# Patient Record
Sex: MALE | Race: WHITE
[De-identification: names, ages, dates, MRNs, and addresses within clinical notes are randomized per-mention and may not be internally consistent; named-entity substitution may affect disease eponyms.]

---

## 2021-04-20 ENCOUNTER — HOSPITAL ENCOUNTER (OUTPATIENT)
Dept: HOSPITAL 35 - PAIN | Age: 61
End: 2021-04-20
Attending: ANESTHESIOLOGY
Payer: COMMERCIAL

## 2021-04-20 VITALS — SYSTOLIC BLOOD PRESSURE: 141 MMHG | DIASTOLIC BLOOD PRESSURE: 102 MMHG

## 2021-04-20 VITALS — HEIGHT: 70.98 IN | WEIGHT: 200 LBS | BODY MASS INDEX: 28 KG/M2

## 2021-04-20 DIAGNOSIS — Z79.891: ICD-10-CM

## 2021-04-20 DIAGNOSIS — Z79.899: ICD-10-CM

## 2021-04-20 DIAGNOSIS — M50.10: Primary | ICD-10-CM

## 2021-04-20 DIAGNOSIS — M48.02: ICD-10-CM

## 2021-04-20 DIAGNOSIS — G89.4: ICD-10-CM

## 2021-04-20 DIAGNOSIS — I10: ICD-10-CM

## 2021-04-20 NOTE — NUR
Pain Clinic Assessment:
 
1. History of Osteoarthritis:
Not Applicable
   History of Rheumatoid Arthritis:
Not Applicable
 
2. Height: 5 ft. 11 in. 180.3 cm.
   Weight: 200.0 lb.  oz. 90.720 kg.
   Patient's BMI: 27.9
 
3. Vital Signs:
   BP: 141/102 Pulse: 77 Resp: 16
   Temp:  02 Sat: 99 ECG Mon:
 
4. Pain Intensity: 6
 
5. Fall Risk:
   Dizziness: Y  Needs help standing or walking: N
   Fallen in the last 3 months: N
   Fall risk comments:
 
 
6. Patient on Blood Thinner: None
 
7. History of Hypertension: Y
 
8. Opioid Therapy greater than 6 weeks: N
   Opiate Contract Signed:
 
9. Risk Assessment Tool Provided: 22/70
 
10. Functional Assessment Tool: LOW
 
11. Recreational Drug Use: Never Drug Type:
    Tobacco Use: Never Smoker Tobacco Type:
       Amount or Packs/day:  How Many Years:
    Alcohol Use: Yes  Frequency: Special Occasions Quant: 1-2

## 2021-04-20 NOTE — HPC
[facility]
[place]   [address]                     PAIN MANAGEMENT CONSULTATION  
_______________________________________________________________________________
 
Name:       REBECCA ROBERTSON           Room #:                     [facility] 
KINSEY.#:      [id number]                       Account #:      [id number]  
Admission:  04/20/21    Attend Phys:    Reddy Winters DO  
Discharge:              Date of Birth:  [birth year]  
                                                          Report #: [id number]
                                                                    [id number]   
_______________________________________________________________________________
THIS REPORT FOR:  
 
cc:  Matias Mensah MD,Matias Winters,Reddy SLATER DO                                          ~
DATE OF SERVICE:  04/20/2021
 
 
CHIEF COMPLAINT:  Neck pain, left upper extremity pain with paresthesias.
 
HISTORY OF PRESENT ILLNESS:  As you know, the patient is a 61-year-old male who
reports acute onset of neck pain, left upper extremity pain with paresthesias
that began after a golf game on 03/10/2021.  The patient originally was
experiencing just strictly neck pain, but this, then progressed overnight to
involve the left upper extremity and a numbness and tingling type fashion.  He
trialled conservative over-the-counter medication management, rest, relaxation,
but did not notice much in the way of improvement.  He discussed this further
with his PCP who trialled him on a Medrol Dosepak, which was helpful, but only
transiently while on the medication.  Symptoms subsequently returned once he
discontinued the medication.  He has sought chiropractic manipulation and
traction techniques, but this has not provided much in the way of improvement. 
He has been sent for physical therapy, but has not initiated the treatment to
date.  He was subsequently referred to our clinic to discuss interventional
treatment options to address suspected cervical radiculopathy.
 
The patient indicates today pain is continuous.  He describes the pain as
burning, throbbing, sharp, numbness and tingling.  Places current pain score
6/10, daily average at 6/10, worst pain has been is 8/10.  The patient states
the pain is improved with right lateral flexion of the cervical spine and
"leaving my arm dangling in."  He states the pain is worsened with movement of
his neck and utilizing his left upper extremity.  He has been referred to our
service to discuss treatment options for suspected cervical radiculopathy.
 
PAST MEDICAL HISTORY:  Hypertension.
 
PAST SURGICAL HISTORY:  None.
 
SOCIAL HISTORY:  The patient denies tobacco, alcohol, IV or illicit drug use. 
He is employed in security.  He is working, not receiving workmen's compensation
nor is he trying to obtain disability benefits.  He is not in litigation in
regards to pain.  He is unaccompanied at today's visit.
 
REVIEW OF SYSTEMS:  Positive for weight gain, decrease in appetite, fatigue and
weakness, numbness and tingling sensations involving the neck and left upper
extremity.  All other review of systems negative per 12-point review of systems
other than those listed in history of present illness.
 
 
 
[address]   [address]                     PAIN MANAGEMENT CONSULTATION  
_______________________________________________________________________________
 
Name:       ERBECCA ROBERTSON           Room #:                     [facility] 
ALESSANDRO#:      [id number]                       Account #:      [id number]  
Admission:  04/20/21    Attend Phys:    Reddy Winters DO  
Discharge:              Date of Birth:  [birth year]  
                                                          Report #: [id number]
                                                                    [id number]   
_______________________________________________________________________________
 
Pain impact score 22/70, mild-to-moderate interference of daily activities
secondary to pain.
 
ALLERGIES:  No reported drug allergies.
 
CURRENT MEDICATIONS:  Lisinopril 20 mg once a day.
 
IMAGING:  MRI of cervical spine obtained 04/08/2021 shows C1-C3 unremarkable,
C3-C4 shows mild disk desiccation, posterior disk osteophyte complex, bilateral
uncovertebral spurring, mild facet arthropathy, mild effacement of the ventral
thecal sac, mild left neural foraminal stenosis.  C4-C5 shows disk desiccation
posterior disk osteophyte complex, bilateral uncovertebral spurring, mild
bilateral facet arthropathy, mild effacement of the ventral thecal sac.  Minimal
left and mild right neural foraminal stenosis; C5-C6, mild disk desiccation,
loss of intervertebral space posterior disk osteophyte complex, bilateral
uncovertebral spurring, bilateral facet arthropathy, mild effacement of the
ventral thecal sac, moderate right and left neural foraminal narrowing.  C6-C7
shows disk osteophyte complex, bilateral uncovertebral spurring, left greater
than right, facets are relatively preserved.  Mild effacement of the ventral
thecal sac, mild right, moderate left neural foraminal narrowing.  C7-T1
unremarkable.
 
PHYSICAL EXAMINATION:
VITAL SIGNS:  Blood pressure 141/102, pulse is 77, respiratory rate 16 and
unlabored.  The patient is 99% on room air.  Height 5 feet 11 inches tall,
weight 200 pounds, BMI calculated 27.9.
GENERAL:  Well-developed, well-nourished, well-hydrated 61-year-old male
appearing stated age, pain is rated at 6/10.
HEENT:  Normocephalic, atraumatic.  Pupils equal, round, and responsive.  The
patient deemed an excellent historian.  He is wearing a mask in compliance with
COVID-19 regulations.
LUNGS:  Clear, no wheeze, rhonchi or rales.
CARDIOVASCULAR:  Regular.  No appreciable gallop, no rub.
ABDOMEN:  Soft, normal to palpation.
EXTREMITIES:  Show no clubbing, no cyanosis, no edema.
MUSCULOSKELETAL:  Upper extremity strength equal and symmetrical 5/5, intact to
light touch from C5 through T1 dermatomes.  Deep tendon reflexes equal and
symmetrical at biceps, brachioradialis and triceps.  Spurling's test positive
left, negative right.  Cervical provocation testing met with slight increase in
pain, specifically with lateral flexion to the left and rotation to the left. 
Pain is somewhat improved with rotation of the right and lateral flexion to the
right.
 
ASSESSMENT:
1.  Cervical radiculopathy.
 
 
 
[facility]
[address]
[place]   [address]                     PAIN MANAGEMENT CONSULTATION  
_______________________________________________________________________________
 
Name:       REBECCA ROBERTSON GODFREY           Room #:                     REG [facility] 
KINSEY.#:      [id number]                       Account #:      [id number]  
Admission:  04/20/21    Attend Phys:    Reddy Winters DO  
Discharge:              Date of Birth:  [birth year]  
                                                          Report #: [id number]
                                                                    [id number]   
_______________________________________________________________________________
2.  Displacement of cervical intervertebral disk with radiculopathy.
3.  Neural foraminal stenosis of the cervical spine.
4.  Facet arthropathy of cervical spine.
5.  Chronic intractable pain.
 
PLAN:
1.  Based on today's physical exam and history the patient has provided, the
description the patient uses in regards to pain as well as location of symptoms
and the description, he uses in regards to pain, the likely source of the
patient's symptoms is a cervical radiculopathy.  We have discussed with the
patient the findings of his MRI and pleased to advise the patient there are no
findings necessary to look toward surgical options, but this symptom, the
patient is experiencing is definitively due to cervical facet arthropathy and
subsequent neural foraminal stenosis of the cervical spine on the left.  We
discussed those findings at the C5-C6 and C6-C7 level consistent with the
patient's symptoms today.  We discussed the treatment options as follows.
 
We discussed physical therapy, stretching exercises and traction techniques as a
treatment course.  We discussed medication management utilizing neuropathic pain
medication and a low dose nonsteroidal anti-inflammatory for baseline pain
control.  We discussed cervical epidural injections under fluoroscopic guidance,
for which the patient was referred to our clinic.  We also discussed surgical
options with the patient, though given the lack of any significant findings, I
would not recommend this option of treatment.  After reviewing the risks and
benefits of all proposed treatment options, the patient chose to move forward
with a cervical epidural injection under fluoroscopic guidance.
2.  We will plan to see the patient back here tomorrow to undergo cervical
epidural injection under fluoroscopic guidance.  We will provide any
preauthorization as necessary through the third party payer to obtain the
authorization to undergo the procedure.  The patient will clear his schedule for
the afternoon to be able to go home and rest and relax after the cervical
epidural injection to obtain a better analgesic benefit.
3.  No medication changes made at today's visit.  The patient will continue
current medical therapy as prior prescribed.
4.  We will see the patient back in followup visit tomorrow for cervical
epidural injection under fluoroscopic guidance in hopes of improving pain.
5.  We wish to thank Dr. Matias Mensah for the opportunity to see this
patient in consultation.  We will keep you apprised of his response to treatment
as we address cervical radiculopathy.  Again, we wish to thank you for the
opportunity to see this patient in consultation.
 
 
 
 
                         
   By:                               
                   
D: 04/20/21 1245                           _____________________________________
T: 04/20/21 1403                           Reddy Winters DO            /nt

## 2021-04-21 ENCOUNTER — HOSPITAL ENCOUNTER (OUTPATIENT)
Dept: HOSPITAL 35 - PAIN | Age: 61
Discharge: HOME | End: 2021-04-21
Attending: ANESTHESIOLOGY
Payer: COMMERCIAL

## 2021-04-21 VITALS — BODY MASS INDEX: 28.98 KG/M2 | HEIGHT: 70.98 IN | WEIGHT: 207 LBS

## 2021-04-21 VITALS — DIASTOLIC BLOOD PRESSURE: 99 MMHG | SYSTOLIC BLOOD PRESSURE: 148 MMHG

## 2021-04-21 DIAGNOSIS — M47.22: ICD-10-CM

## 2021-04-21 DIAGNOSIS — Z98.890: ICD-10-CM

## 2021-04-21 DIAGNOSIS — M50.10: Primary | ICD-10-CM

## 2021-04-21 DIAGNOSIS — M48.02: ICD-10-CM

## 2021-04-21 DIAGNOSIS — I10: ICD-10-CM

## 2021-04-21 DIAGNOSIS — Z79.899: ICD-10-CM

## 2021-04-21 DIAGNOSIS — G89.29: ICD-10-CM

## 2021-04-21 NOTE — NUR
Pain Clinic Assessment:
 
1. History of Osteoarthritis:
Not Applicable
   History of Rheumatoid Arthritis:
Not Applicable
 
2. Height: 5 ft. 11 in. 180.3 cm.
   Weight: 207.0 lb.  oz. 93.895 kg.
   Patient's BMI: 28.9
 
3. Vital Signs:
   BP: 148/99 Pulse: 68 Resp: 16
   Temp:  02 Sat: 100 ECG Mon:
 
4. Pain Intensity: 4
 
5. Fall Risk:
   Dizziness: N  Needs help standing or walking: N
   Fallen in the last 3 months: N
   Fall risk comments:
 
 
6. Patient on Blood Thinner: None
 
7. History of Hypertension: Y
 
8. Opioid Therapy greater than 6 weeks: N
   Opiate Contract Signed:
 
9. Risk Assessment Tool Provided: LOW-0
 
10. Functional Assessment Tool: 22/70
 
11. Recreational Drug Use: Never Drug Type:
    Tobacco Use: Never Smoker Tobacco Type:
       Amount or Packs/day:  How Many Years:
    Alcohol Use: Yes  Frequency:  Quant: